# Patient Record
Sex: FEMALE | Race: BLACK OR AFRICAN AMERICAN | NOT HISPANIC OR LATINO | URBAN - METROPOLITAN AREA
[De-identification: names, ages, dates, MRNs, and addresses within clinical notes are randomized per-mention and may not be internally consistent; named-entity substitution may affect disease eponyms.]

---

## 2018-06-15 ENCOUNTER — OUTPATIENT (OUTPATIENT)
Dept: OUTPATIENT SERVICES | Facility: HOSPITAL | Age: 23
LOS: 1 days | Discharge: HOME | End: 2018-06-15

## 2018-06-18 DIAGNOSIS — F20.9 SCHIZOPHRENIA, UNSPECIFIED: ICD-10-CM

## 2018-09-12 ENCOUNTER — EMERGENCY (EMERGENCY)
Facility: HOSPITAL | Age: 23
LOS: 1 days | Discharge: HOME | End: 2018-09-12
Admitting: EMERGENCY MEDICINE
Payer: COMMERCIAL

## 2018-09-12 VITALS
RESPIRATION RATE: 16 BRPM | OXYGEN SATURATION: 100 % | DIASTOLIC BLOOD PRESSURE: 69 MMHG | SYSTOLIC BLOOD PRESSURE: 110 MMHG | HEART RATE: 77 BPM | TEMPERATURE: 98 F

## 2018-09-12 DIAGNOSIS — F19.10 OTHER PSYCHOACTIVE SUBSTANCE ABUSE, UNCOMPLICATED: ICD-10-CM

## 2018-09-12 PROCEDURE — 90792 PSYCH DIAG EVAL W/MED SRVCS: CPT

## 2018-09-12 PROCEDURE — 99284 EMERGENCY DEPT VISIT MOD MDM: CPT

## 2018-09-12 NOTE — ED BEHAVIORAL HEALTH ASSESSMENT NOTE - REFERRAL / APPOINTMENT DETAILS
Pt given referrals to Crisis clinic and [facility] substance [facility]

## 2018-09-12 NOTE — ED PROVIDER NOTE - MEDICAL DECISION MAKING DETAILS
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 Patient reports she has used K2 and since then she has had hallucinations She was on Lithium, Geodon and Zyprexa and then stopped her medications and is now having hallucinations and hearing voices. She would like to restart her medications.  Denies SI/HI Denies ETOH/Illicit drugs  Medical evaluation performed. There is no clinical evidence of intoxication or any acute medical problem requiring immediate intervention. Final disposition will be determined by psychiatrist.

## 2018-09-12 NOTE — ED BEHAVIORAL HEALTH ASSESSMENT NOTE - HPI (INCLUDE ILLNESS QUALITY, SEVERITY, DURATION, TIMING, CONTEXT, MODIFYING FACTORS, ASSOCIATED SIGNS AND SYMPTOMS)
21 yo AAF w/  reported h/o psychosis NOS who present to  ED looking to re-start psych meds her now  former psychiatry Dr Summers d/c. Pt states that starting back in Dec 2017 she started using K2 and she became psychotic thinking that she was in a movie and that the hospital worker were her family. Pt states that she even tried to get in someone car and tried to take off her clothe. Pt states that they told her at [facility] that she was psychotic NOS. Pt states that they started her on Lithium, Geodon and later Zyprexa. Pt states her old psychiatrist d/c all her meds. 21 yo AAF w/  reported h/o psychosis NOS who present to  ED looking to re-start psych meds her now  former psychiatry Dr Summers d/c. Pt states that starting back in Dec 2017 she started using K2 and she became psychotic thinking that she was in a movie and that the hospital worker were her family. Pt states that she even tried to get in someone car and tried to take off her clothe. Pt states that they told her at [email or web address] that she was psychotic NOS. Pt states that they started her on Lithium, Geodon and later Zyprexa. Pt states her old psychiatrist d/c all her meds. Pt states that she would like to re-start her medication and get connected with a new psychiatrist. Pt states that she still had AH but it only happen in the context of MJ and K2 use . Pt denies any active current suicidal ideation, h/o suicide attempt, homicidal ideation, auditory/visual hallucinations or manic. Pt thoughts are very well organized and her behavior is also well organized. Pt objectively not seen responding to any internal stimuli.     Psychiatric ROS:  Depression: States no depressed mood, states no change in appetite, states no decreased energy, states no problems with concentration, states no change in interests.  Salome: States no hyper-religiousity, states no grandiosity, states no decreased need for sleep, states no flight of ideas, states no distractability, states no sustained irritability or euphoria, states no hypertalkativeness  Anxiety: States no panic attacks.     Psychotic: States no auditory and visual hallucinations.  States no thought insertion/withdrawal/broadcasting.  States no ideas of reference.  SI: States no suicidal ideation , no intent, no plan  HI: States no homicidal ideation     Per family collateral:  Patient has been depressed "for years." He believes that she has been diagnosed with ADHD (had IEP in school) and schizophrenia, and she uses K2 and cannabis regularly. Her mother  of Javad's Disease in 2018 (her uncle also has Cecil's disease, patient was tested for the disease at [facility] and found to be negative). The patient was hospitalized for the first time in 2018 at [facility] for a few months. There, she was found to have toxicology positive for K2. She was diagnosed with schizophrenia and placed on medications. Her last known medications are Lithium 600 mg PO qdaily and 300 mg PO qbedtime; Ziprasidone 60 mg PO qbedtime; Cogentin 2 mg PO qdaily per father. She goes to [facility] ([id number] [id number] [facility] [phone]). After discharge, she followed up with a psychiatrist at [facility] in [place], but "he didn't help." Patient was non-adherent with medications and continued to use K2. She was referred to the ED today as she has appeared more depressed, making statements about "Why was I born?" He said that she has no prior suicide attempts or self injurious behavior. She behaves impulsively, sleeping with multiple men. She has not been caring for her personal hygiene well. She has also been talking to herself at home.

## 2018-09-12 NOTE — ED BEHAVIORAL HEALTH ASSESSMENT NOTE - RISK ASSESSMENT
Low imminent risk given that pt denies any active suicidal ideation, homicidal ideation, auditory/visual hallucinations, CAH, or manic sx. Pt is however chronic moderate risk 2/2 active substance use and currently poor psychosocial support.

## 2018-09-12 NOTE — ED ADULT TRIAGE NOTE - CHIEF COMPLAINT QUOTE
pt has hx of schizophrenia states she was taken off her meds a month ago, now c/o hallucinations and hearing voices. denies SI/HI.

## 2018-09-12 NOTE — ED PROVIDER NOTE - OBJECTIVE STATEMENT
used KD + AH Previous admission no meds beeenbernardo + AH used KD + AH Previous admission no meds juli + AH    t has hx of schizophrenia states she was taken off her meds a month ago, now c/o hallucinations and hearing voices. 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 Patient reports she has used K2 and since then she has had hallucinations She was on Lithium, Geodon and Zyprexa and then stopped her medications and is now having hallucinations and hearing voices. She would like to restart her medications.  Denies SI/HI Denies ETOH/Illicit drugs        t has hx of schizophrenia states she was taken off her meds a month ago, now c/o hallucinations and hearing voices. 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 Patient reports she has used K2 and since then she has had hallucinations She was on Lithium, Geodon and Zyprexa and then stopped her medications and is now having hallucinations and hearing voices. She would like to restart her medications.  Denies SI/HI Denies ETOH/Illicit drugs

## 2018-09-12 NOTE — ED BEHAVIORAL HEALTH ASSESSMENT NOTE - SUMMARY
21 yo AAF w/  reported h/o psychosis NOS who present to  ED looking to re-start psych meds her now former psychiatrist d/c. Pt give he h/o of AH only in the context of K2/MJ use. Pt currently thoughts are well organized as is her behavior and show no objective outward signs of psychosis or dell. Pt educated that her sx are mostly likely due to ongoing substance use and that continue use of such substance can lead to permanent sx. Pt strongly encourage to seek substance tx as well as dual dx tx which pt accepts.   On discussing discharge plan pt father stated that she did not want the pt back at home 2/2 her drug use and behavior. Pt father told that we understand his frustration but we can't hold the pt in the ED and she doesn't meet criteria for inpatient psych admission. Pt was informed of father comments and father was called back but didn't answer so a voicemail was left stating that pt would be given women shelter information.

## 2018-09-12 NOTE — ED BEHAVIORAL HEALTH ASSESSMENT NOTE - DESCRIPTION
Calm and cooperative   Vital Signs Last 24 Hrs  T(C): 36.8 (12 Sep 2018 19:42), Max: 36.8 (12 Sep 2018 19:42)  T(F): 98.2 (12 Sep 2018 19:42), Max: 98.2 (12 Sep 2018 19:42)  HR: 77 (12 Sep 2018 19:42) (77 - 77)  BP: 110/69 (12 Sep 2018 19:42) (110/69 - 110/69)  BP(mean): --  RR: 16 (12 Sep 2018 19:42) (16 - 16)  SpO2: 100% (12 Sep 2018 19:42) (100% - 100%) None reported Lives with parents and step brother

## 2018-09-12 NOTE — ED BEHAVIORAL HEALTH ASSESSMENT NOTE - SAFETY PLAN DETAILS
Discussed and agreed with pt. Pt will call 911 and go to the nearest ER if she feels unsafe or a danger to self

## 2018-09-12 NOTE — ED BEHAVIORAL HEALTH NOTE - BEHAVIORAL HEALTH NOTE
Collateral from father Jourdan Egan [phone]:    Patient has been depressed "for years." He believes that she has been diagnosed with ADHD (had IEP in school) and schizophrenia, and she uses K2 and cannabis regularly. Her mother  of Lake Ann's Disease in 2018 (her uncle also has Lake Ann's disease, patient was tested for the disease at [facility] and found to be negative). The patient was hospitalized for the first time in 2018 at [facility] for a few months. There, she was found to have toxicology positive for K2. She was diagnosed with schizophrenia and placed on medications. Her last known medications are Lithium 600 mg PO qdaily and 300 mg PO qbedtime; Ziprasidone 60 mg PO qbedtime; Cogentin 2 mg PO qdaily per father. She goes to [facility] ([address] [place], [place] [id number] [facility] [phone]). After discharge, she followed up with a psychiatrist at [facility] in [place], but "he didn't help." Patient was non-adherent with medications and continued to use K2. She was referred to the ED today as she has appeared more depressed, making statements about "Why was I born?" He said that she has no prior suicide attempts or self injurious behavior. She behaves impulsively, sleeping with multiple men. She has not been caring for her personal hygiene well. She has also been talking to herself at home.

## 2018-09-12 NOTE — ED BEHAVIORAL HEALTH ASSESSMENT NOTE - AXIS IV
Occupational problems/Problems with access to healthcare services/Problems with primary support/Housing problems

## 2018-09-12 NOTE — ED ADULT NURSE NOTE - NSIMPLEMENTINTERV_GEN_ALL_ED
Implemented All Universal Safety Interventions:  [place] to call system. Call bell, personal items and telephone within reach. Instruct patient to call for assistance. Room bathroom lighting operational. Non-slip footwear when patient is off stretcher. Physically safe environment: no spills, clutter or unnecessary equipment. Stretcher in lowest position, wheels locked, appropriate side rails in place.

## 2022-01-01 NOTE — ED BEHAVIORAL HEALTH NOTE - BEHAVIORAL HEALTH NOTE
was asked to discuss shelter options with the patient.  was told the patient was not permitted to return to the family home.  called patient's father, [phone], who stated he had the patient's phone, which the patient was requesting to have, and then hung up on writer, not answering the phone for subsequent calls.  discussed shelter options with the patient, who stated she had no other family or friends to stay with.  discussed options with her, and she chose to go to the [facility].  provided her with the sheet of shelters, including drop-in shelters, as well as a [employer] card trip planner and 2 [employer] cards, # [phone] and [phone], advising her of the free phones in the waiting room area in case she should need them. Jocelyne Story  (RN)  2022 22:56:06

## 2022-10-19 NOTE — ED PROVIDER NOTE - PRINCIPAL DIAGNOSIS
Impression: S/P Cataract Extraction by phacoemulsification with IOL placement; Dexycu OS - 7 Days. Presence of intraocular lens  Z96.1. Post operative instructions reviewed - Plan: Return as scheduled, sooner if vision suddenly changes or pain increases. Discussed pt. may see floater/Dexycu.  DEXYCU OS No pertinent family history in first degree relatives Psychosis no

## 2023-12-06 NOTE — ED PROVIDER NOTE - CARDIAC, MLM
If you are a smoker, it is important for your health to stop smoking. Please be aware that second hand smoke is also harmful. Normal rate, regular rhythm.  Heart sounds S1, S2.  No murmurs, rubs or gallops.